# Patient Record
(demographics unavailable — no encounter records)

---

## 2024-11-04 NOTE — HISTORY OF PRESENT ILLNESS
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  She was referred for dedicated thyroid ultrasound on 10/4/2024.  The thyroid gland was essentially normal in size with mildly heterogeneous echogenicity and increased vascularity.  2 nodules were identified in the right thyroid lobe including a upper pole 1.1 x 0.7 x 1.0 cm nodule TI-RADS TR 4 and a second right mid lobe nodule measuring 1.6 x 1.0 x 1.2 cm, TI-RADS TR 4 and biopsy recommended.  There is no mention of any evaluation of her lymph nodes. Denita denies recent shortness of breath, voice changes, dysphagia, anterior neck pain, neck pressure or mass. There is no family history of thyroid cancer. She denies any known radiation exposures in her youth. She is euthyroid. She denies fever, body aches, cough, cyanosis, chest burning, anosmia or recent known COVID exposures.  All family members at home are well.  She is vaccinated and boosted. THERESE Abraham

## 2024-11-04 NOTE — END OF VISIT
[TextEntry] : I have spent 60 min of time for the encounter exclusive of any procedures performed during the visit.  Anesthesia Volume In Cc: 12

## 2024-11-04 NOTE — PROCEDURE
[Image(s) Captured] : image(s) captured and filed [Unable to Cooperate with Mirror] : patient unable to cooperate with mirror [Gag Reflex] : gag reflex preventing mirror examination [Topical Lidocaine] : topical lidocaine [Oxymetazoline HCl] : oxymetazoline HCl [Flexible Endoscope] : examined with the flexible endoscope [Serial Number: ___] : Serial Number: [unfilled] [FreeTextEntry3] : ------------------------------------------------------------------------------------------- ...................................[facility] ...........ULTRASOUND-GUIDED THYROID FINE NEEDLE ASPIRATION BIOPSY REPORT  NAME: ADEOLA CELIS.........MR# [id number] .........: 1976 .........DATE: 2024 .........TIME: 4:31 PM  HISTORY/ INDICATIONS: 48- year-old female with a dominant right mid lobe solid TI-RADS TR 4 nodule.  EXAM: Real-time high-resolution ultrasound imaging of the thyroid gland and/or other neck structures, was performed in the longitudinal and transverse planes with color power Doppler supplementation. Images were captured for lesion characterization, measurement and needle placement.  FINDINGS: A right mid lobe nodule measuring 1.61 x 0.95 x 1.09 cm (longitudinal x AP x transverse) was identified and targeted for USG-FNA.  The nodule had the following ultrasonographic characteristics: solid, isoechoic, heterogeneous, no punctate echogenic foci, slightly irregular margins, grade 2 vascularity on color Doppler, and wider-than-tall, TI-RADS TR 4.  PROCEDURE: A time out took place and documented for correct patient identifiers, site and side of procedure.  After obtaining informed consent with discussion of risks, benefits and alternatives, the patient was positioned semi-supine, the skin was prepped with alcohol and 0.5 cc of 1% Lidocaine with 1:100,000 epinephrine was injected for local anesthesia. A #25-gauge needle was then passed along the perpendicular plane of the transducer, positioned within the nodule and confirmed with ultrasonography.  Multiple aspirations were made within the nodule with two separate needle punctures, four passes each.  Aspirates were directly placed into both cytolyt and molecular preservative solutions for cytologic analysis, immunohistochemistry, and possible molecular genomic diagnostics.  The patient tolerated the procedure well without complications and was discharged with signed post-procedure instructions indicating the importance of following up on all results.   ASSESSMENT & PLAN: Successful USG-FNA of a mid right lobe nodule was well tolerated without complications. The patient will be contacted to review the cytologic findings as soon as available for further treatment planning.  A discussion took place with the patient who accepted the responsibility to call the office to review the cytology results if no communication occurs within two weeks.   Electronically signed by referring, interpreting and reporting physician: KAYLEIGH GIRARD M.D., FACS on 2024, 4:30 PM. -------------------------------------------------------------------------------------------------------------------------------------------------------------------------------------   [facility]: [address], [address] [place] [address], [phone] (voice), [phone] (fax). -------------------------------------------------------------------------------------------------------------------------------------------------------------------------------------     [de-identified] : Verbal informed consent was obtained from the patient.  Findings: The nasal septum is minimally deviated to the right.  There are no masses or polyps, and the nasal mucosa and secretions are normal. The choanae and posterior nasopharynx are normal without masses or drainage. The Eustachian tube orifices appear patent. The pharynx, including the posterior and lateral pharyngeal walls, the vallecula and base of tongue are normal without ulcerations, lesions or masses. The hypopharynx including the pyriform sinuses open well without pooling of secretions, mucosal lesions or masses. The supraglottic larynx including the epiglottis, petiole, arytenoids, glossoepiglottic, aryepiglottic and pharyngoepiglottic folds are normal without mucosal lesions, ulcerations or masses. The glottis reveals normal false vocal folds. The true vocal folds are glistening white, tense and of equal length, without paralysis, having symmetric mobility on adduction and abduction. There are no mucosal lesions, nodules, cysts, erythroplasia or leukoplakia. The posterior cricoid area has healthy pink mucosa in the interarytenoid area and esophageal inlet. There is mild thickening/pachydermia of the interarytenoid mucosa suggestive of posterior laryngitis from laryngopharyngeal acid reflux disease. The trachea is clear without narrowing in the immediate subglottic region, without deviation or lesions. ------------------------------------------------------------------------------------------- [de-identified] : a thyroid neoplasm

## 2024-11-04 NOTE — PHYSICAL EXAM
[TextEntry] : Focused Head and Neck Examination:  General Appearance: The patient has a normal appearance (head and face), able to communicate with normal speech and is a well-groomed, well-developed, well-nourished, thin female in no apparent distress.  Breathing was silent and not labored. The patient was alert had normal affect and oriented to person, place, and time. The patient had normal facial and neck skin with normal facial symmetry, strength and motion, no visible/ palpable facial masses or sinus tenderness.  Otologic Exam: The pinnae and bilateral external ear canals were without lesions and clear.  The tympanic membranes were normal bilaterally without perforation and demonstrated normal mobility. There was no evidence of middle ear effusion or infection. Hearing is grossly normal to finger rub.   External Nasal Exam: The external nose was normal in appearance without lesions or deformity.    Intranasal Exam: There were no mucosal lesions, discolorations, nasal polyps, or masses. The nasal septum was intact without perforations. The nasal septum was deviated slightly to the right. The inferior turbinates were minimally congested. The middle turbinates were normal bilaterally.  The middle meatus was clear, and the secretions were normal.  Nasopharynx: There were no visible masses, mucosal ulcerations, or other lesions.  Secretions were normal.   Oral cavity: The patient's lips and vermillion border were normal without lesions, ulcerations or discolorations. Dentition was perfect, the gums were normal, the oral mucosa was normal and there were no lesions, discolorations, ulcerations, erythema, or leukoplakia.  The floor of mouth was without lesions or palpable calculi. The oral tongue has normal mobility, without palpable or visible lesions, ulcerations, nodularity or tenderness.  All surfaces including lateral, ventral and dorsal mucosa examined and palpated. Expressed salivary flow was normal.  Stensens ducts are without palpable calculi.    Oropharynx: The tongue base was without palpable lesions, the soft palate was normal without lesions, the hard palate was normal without lesions, ulcerations, nodularity or discolorations.  The palatine tonsils are present and normal, and the lingual tonsils were normal.  Pharynx: The lateral and posterior pharyngeal walls were intact without mucosal lesions, discolorations, ulcerations, or masses.    Larynx and Hypopharynx: Flexible fiber optic laryngoscopy was performed with topical anesthesia using 4% lidocaine and 0.5 % Oxymetazoline on cotton pledgets. More details of the exam are outlined in my procedure note but this examination revealed normal, symmetric vocal fold mobility and normal mucosa without vocal fold lesions, discolorations, or ulcerations.  There is minimal posterior interarytenoid mucosal pachydermia.  The epiglottis and false vocal folds were normal without mucosal lesions. The piriform sinuses opened well and there were no lesions or pooling of saliva. The trachea was clear without narrowing in the immediate subglottic area and in midline position without lesions.    Neck Exam: There was no palpable lymphadenopathy or bruits in the central or lateral vinicius drainage basins levels I-VI.  There was no asymmetry, pulsatile masses or nodules. The parotid and submandibular glands were not enlarged or tender and without palpable nodules or mass.  The temporomandibular joints were normal bilaterally without deviation/subluxation/click/tenderness or crepitus to palpation.    Thyroid Examination: The thyroid gland was not enlarged nontender and with a palpable right mid lobe nodule that appears to be mobile.  Neurological Exam: Cranial nerves II through XII were intact.  There was no visible tremor. Gross motor and sensory functions are normal.  A Chvostek sign was positive.  Ocular Exam: Pupils were equal and responsive to light.  Extraocular movements and gaze were normal. The sclera and conjunctiva were normal and anicteric.  Nystagmus was absent. No sign of erythema, ulcerations or lesions.    Respiratory: Respiratory effort is normal with symmetric chest expansion and no retractions or use of accessory muscles.    Cardiovascular: Extremities are normal with strong pulses, normal temperature, and no edema.

## 2025-07-21 NOTE — REASON FOR VISIT
[FreeTextEntry2] : multiple thyroid nodules s/p FNA and complaints of pressure, globus and throat clearing when supine.  [FreeTextEntry1] : PCP is Benson Turner

## 2025-07-21 NOTE — PROCEDURE
[Image(s) Captured] : image(s) captured and filed [Unable to Cooperate with Mirror] : patient unable to cooperate with mirror [Gag Reflex] : gag reflex preventing mirror examination [Topical Lidocaine] : topical lidocaine [Oxymetazoline HCl] : oxymetazoline HCl [Flexible Endoscope] : examined with the flexible endoscope [Complicated Symptoms] : complicated symptoms requiring more thorough examination than provided by mirror [Globus] : globus [Serial Number: ___] : Serial Number: [unfilled] [FreeTextEntry3] : ------------------------------------------------------------------------------------------- [facility] THYROID/NECK ULTRASOUND REPORT  NAME: ADEOLA CELIS.........MR# [id number] .........: 1976 .........DATE: 2025.  HISTORY/ INDICATIONS: 49- year-old female with a dominant right mid lobe solid TI-RADS TR 4 nodule, previously biopsied and benign with new complaint of a globus sensation and concern for growth of the dominant right lobe nodule.  EXAM: Real-time high-resolution ultrasound imaging of the thyroid gland and/or other neck structures, was performed in the longitudinal and transverse planes with color power Doppler supplementation. Images were captured for lesion characterization, measurement.  This is a limited ultrasound only to assess any growth of the previously identified nodules.   FINDINGS: A right mid lobe nodule measuring 1.51 x 0.86 x 1.08 cm, previously 1.61 x 0.95 x 1.09 cm (longitudinal x AP x transverse) stable.  A right mid to upper lobe nodule remains spongiform and measures 0.98 by a 0.69 x 0.96 cm, previously 1.1 x 0.7 x 1.0 cm, stable.  ASSESSMENT & PLAN: The previously biopsied right mid lobe nodule remains completely stable in appearance and size.  The right mid to upper lobe nodule also remains unchanged.  Repeat another ultrasound in 1 year.  Electronically signed by referring, interpreting and reporting physician: KAYLEIGH GIRARD M.D., FACS on 2025, 3:10 PM.  [facility]: [address] [place] [address], [address] [place] [id number], [phone] (voice), [phone] (fax). -------------------------------------------------------------------------------------------------------------------------------------------------------------------------------------     [de-identified] : Verbal informed consent was obtained from the patient.  Findings: The nasal septum is minimally deviated to the right.  There are no masses or polyps, and the nasal mucosa and secretions are normal. The choanae and posterior nasopharynx are normal without masses or drainage. The Eustachian tube orifices appear patent. The pharynx, including the posterior and lateral pharyngeal walls, the vallecula and base of tongue are normal without ulcerations, lesions or masses. The hypopharynx including the pyriform sinuses open well without pooling of secretions, mucosal lesions or masses. The supraglottic larynx including the epiglottis, petiole, arytenoids, glossoepiglottic, aryepiglottic and pharyngoepiglottic folds are normal without mucosal lesions, ulcerations or masses. The glottis reveals normal false vocal folds. The true vocal folds are glistening white, tense and of equal length, without paralysis, having symmetric mobility on adduction and abduction. There are no mucosal lesions, nodules, cysts, erythroplasia or leukoplakia. The posterior cricoid area has healthy pink mucosa in the interarytenoid area and esophageal inlet. There is mild thickening/pachydermia of the interarytenoid mucosa suggestive of posterior laryngitis from laryngopharyngeal acid reflux disease. The trachea is clear without narrowing in the immediate subglottic region, without deviation or lesions. ------------------------------------------------------------------------------------------- [de-identified] : a thyroid neoplasm and new globus sensation

## 2025-07-21 NOTE — HISTORY OF PRESENT ILLNESS
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  She was referred for dedicated thyroid ultrasound on 10/4/2024.  The thyroid gland was essentially normal in size with mildly heterogeneous echogenicity and increased vascularity.  2 nodules were identified in the right thyroid lobe including a upper pole 1.1 x 0.7 x 1.0 cm nodule TI-RADS TR 4 and a second right mid lobe nodule measuring 1.6 x 1.0 x 1.2 cm, TI-RADS TR 4 and biopsy recommended.  There is no mention of any evaluation of her lymph nodes. Denita denies recent shortness of breath, voice changes, dysphagia, anterior neck pain, neck pressure or mass. There is no family history of thyroid cancer. She denies any known radiation exposures in her youth. She is euthyroid. She denies fever, body aches, cough, cyanosis, chest burning, anosmia or recent known COVID exposures.  All family members at home are well.  She is vaccinated and boosted. -------------------------------------------------------------------------------------------------------------------------------------------------------------------------------------  [FreeTextEntry1] : Denita returns earlier than anticipated after she had undergone an FNA biopsy from a 1.6 cm right mid lobe nodule reported as benign, [place] category II.  She is euthyroid and has no risk factors for thyroid cancer.  She returns because of sensations of pressure and that something's caught in her throat when swallowing associated with increased throat clearing in the morning hours.  She has rare GERD.  Her weight is generally stable, and she is euthyroid. Denita denies recent shortness of breath, voice changes, dysphagia, anterior neck pain or mass.

## 2025-07-21 NOTE — DATA REVIEWED
[de-identified] : see HPI  [de-identified] : see HPI  [de-identified] : Cytology report reviewed from her prior FNA biopsy

## 2025-07-21 NOTE — HISTORY OF PRESENT ILLNESS
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  She was referred for dedicated thyroid ultrasound on 10/4/2024.  The thyroid gland was essentially normal in size with mildly heterogeneous echogenicity and increased vascularity.  2 nodules were identified in the right thyroid lobe including a upper pole 1.1 x 0.7 x 1.0 cm nodule TI-RADS TR 4 and a second right mid lobe nodule measuring 1.6 x 1.0 x 1.2 cm, TI-RADS TR 4 and biopsy recommended.  There is no mention of any evaluation of her lymph nodes. Denita denies recent shortness of breath, voice changes, dysphagia, anterior neck pain, neck pressure or mass. There is no family history of thyroid cancer. She denies any known radiation exposures in her youth. She is euthyroid. She denies fever, body aches, cough, cyanosis, chest burning, anosmia or recent known COVID exposures.  All family members at home are well.  She is vaccinated and boosted. -------------------------------------------------------------------------------------------------------------------------------------------------------------------------------------  [FreeTextEntry1] : Denita returns earlier than anticipated after she had undergone an FNA biopsy from a 1.6 cm right mid lobe nodule reported as benign, [place] category II.  She is euthyroid and has no risk factors for thyroid cancer.  She returns because of sensations of pressure and that something's caught in her throat when swallowing associated with increased throat clearing in the morning hours.  She has rare GERD.  Her weight is generally stable, and she is euthyroid. Denita denies recent shortness of breath, voice changes, dysphagia, anterior neck pain or mass.

## 2025-07-21 NOTE — PROCEDURE
[Image(s) Captured] : image(s) captured and filed [Unable to Cooperate with Mirror] : patient unable to cooperate with mirror [Gag Reflex] : gag reflex preventing mirror examination [Topical Lidocaine] : topical lidocaine [Oxymetazoline HCl] : oxymetazoline HCl [Flexible Endoscope] : examined with the flexible endoscope [Complicated Symptoms] : complicated symptoms requiring more thorough examination than provided by mirror [Globus] : globus [Serial Number: ___] : Serial Number: [unfilled] [FreeTextEntry3] : ------------------------------------------------------------------------------------------- [facility] THYROID/NECK ULTRASOUND REPORT  NAME: ADEOLA CELIS.........MR# [id number] .........: 1976 .........DATE: 2025.  HISTORY/ INDICATIONS: 49- year-old female with a dominant right mid lobe solid TI-RADS TR 4 nodule, previously biopsied and benign with new complaint of a globus sensation and concern for growth of the dominant right lobe nodule.  EXAM: Real-time high-resolution ultrasound imaging of the thyroid gland and/or other neck structures, was performed in the longitudinal and transverse planes with color power Doppler supplementation. Images were captured for lesion characterization, measurement.  This is a limited ultrasound only to assess any growth of the previously identified nodules.   FINDINGS: A right mid lobe nodule measuring 1.51 x 0.86 x 1.08 cm, previously 1.61 x 0.95 x 1.09 cm (longitudinal x AP x transverse) stable.  A right mid to upper lobe nodule remains spongiform and measures 0.98 by a 0.69 x 0.96 cm, previously 1.1 x 0.7 x 1.0 cm, stable.  ASSESSMENT & PLAN: The previously biopsied right mid lobe nodule remains completely stable in appearance and size.  The right mid to upper lobe nodule also remains unchanged.  Repeat another ultrasound in 1 year.  Electronically signed by referring, interpreting and reporting physician: KAYLEIGH GIRARD M.D., FACS on 2025, 3:10 PM.  [facility]: [address] [place] [address], [address] [place] [address], [phone] (voice), [phone] (fax). -------------------------------------------------------------------------------------------------------------------------------------------------------------------------------------     [de-identified] : Verbal informed consent was obtained from the patient.  Findings: The nasal septum is minimally deviated to the right.  There are no masses or polyps, and the nasal mucosa and secretions are normal. The choanae and posterior nasopharynx are normal without masses or drainage. The Eustachian tube orifices appear patent. The pharynx, including the posterior and lateral pharyngeal walls, the vallecula and base of tongue are normal without ulcerations, lesions or masses. The hypopharynx including the pyriform sinuses open well without pooling of secretions, mucosal lesions or masses. The supraglottic larynx including the epiglottis, petiole, arytenoids, glossoepiglottic, aryepiglottic and pharyngoepiglottic folds are normal without mucosal lesions, ulcerations or masses. The glottis reveals normal false vocal folds. The true vocal folds are glistening white, tense and of equal length, without paralysis, having symmetric mobility on adduction and abduction. There are no mucosal lesions, nodules, cysts, erythroplasia or leukoplakia. The posterior cricoid area has healthy pink mucosa in the interarytenoid area and esophageal inlet. There is mild thickening/pachydermia of the interarytenoid mucosa suggestive of posterior laryngitis from laryngopharyngeal acid reflux disease. The trachea is clear without narrowing in the immediate subglottic region, without deviation or lesions. ------------------------------------------------------------------------------------------- [de-identified] : a thyroid neoplasm and new globus sensation

## 2025-07-21 NOTE — DATA REVIEWED
[de-identified] : see HPI  [de-identified] : see HPI  [de-identified] : Cytology report reviewed from her prior FNA biopsy

## 2025-07-21 NOTE — CONSULT LETTER
[Dear  ___] : Dear  [unfilled], [Consult Letter:] : I had the pleasure of evaluating your patient, [unfilled]. [Please see my note below.] : Please see my note below. [Consult Closing:] : Thank you very much for allowing me to participate in the care of this patient.  If you have any questions, please do not hesitate to contact me. [Sincerely,] : Sincerely, [FreeTextEntry3] :  Joao Mcdermott M.D., FACS, [facility] Director [facility] at [facility] Certified in Thyroid/Parathyroid/Neck Ultrasound, [facility]/ [facility] , Department of Otolaryngology [facility] at [facility]

## 2025-07-21 NOTE — CONSULT LETTER
[Dear  ___] : Dear  [unfilled], [Consult Letter:] : I had the pleasure of evaluating your patient, [unfilled]. [Please see my note below.] : Please see my note below. [Consult Closing:] : Thank you very much for allowing me to participate in the care of this patient.  If you have any questions, please do not hesitate to contact me. [Sincerely,] : Sincerely, [FreeTextEntry3] :  Joao Mcdermott M.D., FACS, [facility] Director [facility] Certified in Thyroid/Parathyroid/Neck Ultrasound, [facility]/ [facility] , Department of Otolaryngology [facility] at [facility]